# Patient Record
Sex: FEMALE | Race: BLACK OR AFRICAN AMERICAN | NOT HISPANIC OR LATINO | Employment: FULL TIME | URBAN - METROPOLITAN AREA
[De-identification: names, ages, dates, MRNs, and addresses within clinical notes are randomized per-mention and may not be internally consistent; named-entity substitution may affect disease eponyms.]

---

## 2024-02-24 ENCOUNTER — HOSPITAL ENCOUNTER (EMERGENCY)
Facility: CLINIC | Age: 39
Discharge: HOME | End: 2024-02-24
Attending: EMERGENCY MEDICINE | Admitting: EMERGENCY MEDICINE
Payer: COMMERCIAL

## 2024-02-24 VITALS
DIASTOLIC BLOOD PRESSURE: 108 MMHG | SYSTOLIC BLOOD PRESSURE: 138 MMHG | WEIGHT: 293 LBS | OXYGEN SATURATION: 99 % | RESPIRATION RATE: 18 BRPM | HEART RATE: 92 BPM | TEMPERATURE: 98.5 F | HEIGHT: 67 IN | BODY MASS INDEX: 45.99 KG/M2

## 2024-02-24 DIAGNOSIS — L03.317 CELLULITIS OF LEFT BUTTOCK: ICD-10-CM

## 2024-02-24 LAB
ANION GAP SERPL CALCULATED.3IONS-SCNC: 11 MMOL/L (ref 7–15)
BASOPHILS # BLD AUTO: 0.1 10E3/UL (ref 0–0.2)
BASOPHILS NFR BLD AUTO: 0 %
BUN SERPL-MCNC: 9.7 MG/DL (ref 6–20)
CALCIUM SERPL-MCNC: 9 MG/DL (ref 8.6–10)
CHLORIDE SERPL-SCNC: 103 MMOL/L (ref 98–107)
CREAT SERPL-MCNC: 0.78 MG/DL (ref 0.51–0.95)
CRP SERPL-MCNC: 187.5 MG/L
DEPRECATED HCO3 PLAS-SCNC: 23 MMOL/L (ref 22–29)
EGFRCR SERPLBLD CKD-EPI 2021: >90 ML/MIN/1.73M2
EOSINOPHIL # BLD AUTO: 0.2 10E3/UL (ref 0–0.7)
EOSINOPHIL NFR BLD AUTO: 1 %
ERYTHROCYTE [DISTWIDTH] IN BLOOD BY AUTOMATED COUNT: 14.7 % (ref 10–15)
GLUCOSE SERPL-MCNC: 100 MG/DL (ref 70–99)
HCT VFR BLD AUTO: 37.7 % (ref 35–47)
HGB BLD-MCNC: 11.9 G/DL (ref 11.7–15.7)
HOLD SPECIMEN: NORMAL
IMM GRANULOCYTES # BLD: 0.1 10E3/UL
IMM GRANULOCYTES NFR BLD: 1 %
LACTATE SERPL-SCNC: 1 MMOL/L (ref 0.7–2)
LYMPHOCYTES # BLD AUTO: 2.2 10E3/UL (ref 0.8–5.3)
LYMPHOCYTES NFR BLD AUTO: 12 %
MCH RBC QN AUTO: 27.1 PG (ref 26.5–33)
MCHC RBC AUTO-ENTMCNC: 31.6 G/DL (ref 31.5–36.5)
MCV RBC AUTO: 86 FL (ref 78–100)
MONOCYTES # BLD AUTO: 1.3 10E3/UL (ref 0–1.3)
MONOCYTES NFR BLD AUTO: 7 %
NEUTROPHILS # BLD AUTO: 15.3 10E3/UL (ref 1.6–8.3)
NEUTROPHILS NFR BLD AUTO: 79 %
NRBC # BLD AUTO: 0 10E3/UL
NRBC BLD AUTO-RTO: 0 /100
PLATELET # BLD AUTO: 295 10E3/UL (ref 150–450)
POTASSIUM SERPL-SCNC: 4.1 MMOL/L (ref 3.4–5.3)
RBC # BLD AUTO: 4.39 10E6/UL (ref 3.8–5.2)
SODIUM SERPL-SCNC: 137 MMOL/L (ref 135–145)
WBC # BLD AUTO: 19.1 10E3/UL (ref 4–11)

## 2024-02-24 PROCEDURE — 36415 COLL VENOUS BLD VENIPUNCTURE: CPT | Performed by: EMERGENCY MEDICINE

## 2024-02-24 PROCEDURE — 84703 CHORIONIC GONADOTROPIN ASSAY: CPT

## 2024-02-24 PROCEDURE — 85025 COMPLETE CBC W/AUTO DIFF WBC: CPT | Performed by: EMERGENCY MEDICINE

## 2024-02-24 PROCEDURE — 96365 THER/PROPH/DIAG IV INF INIT: CPT

## 2024-02-24 PROCEDURE — 99284 EMERGENCY DEPT VISIT MOD MDM: CPT | Mod: 25

## 2024-02-24 PROCEDURE — 80048 BASIC METABOLIC PNL TOTAL CA: CPT | Performed by: EMERGENCY MEDICINE

## 2024-02-24 PROCEDURE — 250N000013 HC RX MED GY IP 250 OP 250 PS 637: Performed by: EMERGENCY MEDICINE

## 2024-02-24 PROCEDURE — 86140 C-REACTIVE PROTEIN: CPT | Performed by: EMERGENCY MEDICINE

## 2024-02-24 PROCEDURE — 96361 HYDRATE IV INFUSION ADD-ON: CPT

## 2024-02-24 PROCEDURE — 258N000003 HC RX IP 258 OP 636: Performed by: EMERGENCY MEDICINE

## 2024-02-24 PROCEDURE — 87040 BLOOD CULTURE FOR BACTERIA: CPT | Mod: XS | Performed by: EMERGENCY MEDICINE

## 2024-02-24 PROCEDURE — 83605 ASSAY OF LACTIC ACID: CPT | Performed by: EMERGENCY MEDICINE

## 2024-02-24 PROCEDURE — 250N000011 HC RX IP 250 OP 636: Performed by: EMERGENCY MEDICINE

## 2024-02-24 RX ORDER — IBUPROFEN 600 MG/1
600 TABLET, FILM COATED ORAL ONCE
Status: COMPLETED | OUTPATIENT
Start: 2024-02-24 | End: 2024-02-24

## 2024-02-24 RX ORDER — KETOROLAC TROMETHAMINE 15 MG/ML
15 INJECTION, SOLUTION INTRAMUSCULAR; INTRAVENOUS ONCE
Status: DISCONTINUED | OUTPATIENT
Start: 2024-02-24 | End: 2024-02-24

## 2024-02-24 RX ORDER — CEFAZOLIN SODIUM IN 0.9 % NACL 3 G/100 ML
3 INTRAVENOUS SOLUTION, PIGGYBACK (ML) INTRAVENOUS
Status: DISCONTINUED | OUTPATIENT
Start: 2024-02-24 | End: 2024-02-24

## 2024-02-24 RX ORDER — OXYCODONE HYDROCHLORIDE 5 MG/1
5-10 TABLET ORAL EVERY 6 HOURS PRN
Qty: 10 TABLET | Refills: 0 | Status: SHIPPED | OUTPATIENT
Start: 2024-02-24 | End: 2024-02-27

## 2024-02-24 RX ORDER — CEFAZOLIN SODIUM IN 0.9 % NACL 3 G/100 ML
3 INTRAVENOUS SOLUTION, PIGGYBACK (ML) INTRAVENOUS EVERY 8 HOURS
Status: DISCONTINUED | OUTPATIENT
Start: 2024-02-24 | End: 2024-02-24

## 2024-02-24 RX ORDER — CEPHALEXIN 500 MG/1
500 CAPSULE ORAL 4 TIMES DAILY
Qty: 28 CAPSULE | Refills: 0 | Status: SHIPPED | OUTPATIENT
Start: 2024-02-24 | End: 2024-03-02

## 2024-02-24 RX ORDER — CEFAZOLIN SODIUM IN 0.9 % NACL 3 G/100 ML
3 INTRAVENOUS SOLUTION, PIGGYBACK (ML) INTRAVENOUS ONCE
Qty: 100 ML | Refills: 0 | Status: COMPLETED | OUTPATIENT
Start: 2024-02-24 | End: 2024-02-24

## 2024-02-24 RX ORDER — OXYCODONE HYDROCHLORIDE 5 MG/1
10 TABLET ORAL ONCE
Status: COMPLETED | OUTPATIENT
Start: 2024-02-24 | End: 2024-02-24

## 2024-02-24 RX ADMIN — OXYCODONE HYDROCHLORIDE 10 MG: 5 TABLET ORAL at 08:22

## 2024-02-24 RX ADMIN — SODIUM CHLORIDE 1000 ML: 9 INJECTION, SOLUTION INTRAVENOUS at 08:22

## 2024-02-24 RX ADMIN — CEFAZOLIN 3 G: 1 INJECTION, POWDER, FOR SOLUTION INTRAMUSCULAR; INTRAVENOUS at 09:50

## 2024-02-24 RX ADMIN — IBUPROFEN 600 MG: 600 TABLET, FILM COATED ORAL at 08:22

## 2024-02-24 ASSESSMENT — COLUMBIA-SUICIDE SEVERITY RATING SCALE - C-SSRS
6. HAVE YOU EVER DONE ANYTHING, STARTED TO DO ANYTHING, OR PREPARED TO DO ANYTHING TO END YOUR LIFE?: NO
1. IN THE PAST MONTH, HAVE YOU WISHED YOU WERE DEAD OR WISHED YOU COULD GO TO SLEEP AND NOT WAKE UP?: NO
2. HAVE YOU ACTUALLY HAD ANY THOUGHTS OF KILLING YOURSELF IN THE PAST MONTH?: NO

## 2024-02-24 ASSESSMENT — ACTIVITIES OF DAILY LIVING (ADL)
ADLS_ACUITY_SCORE: 33
ADLS_ACUITY_SCORE: 35
ADLS_ACUITY_SCORE: 33

## 2024-02-24 NOTE — ED NOTES
Bed: ED17  Expected date:   Expected time:   Means of arrival:   Comments:  Triage wound infection

## 2024-02-24 NOTE — ED NOTES
Pt is alert and oriented, respiration rate regular and unlabored. Pt verbalized understanding regarding discharge instructions, when to return to the ED, and follow up appointment details. Pt instructed on home care instructions and states she is comfortable with discharging from the hospital. Pt tolerating oral fluids, gait steady and ambulated without difficulty. Pt reported having a ride pick her up from the ED.

## 2024-02-24 NOTE — ED PROVIDER NOTES
"  History     Chief Complaint:  Wound Infection       The history is provided by the patient.      German Lowe is a 38 year old female who presents for a wound check. Patient reports she is concerned for an abscess with onset 3 days ago on her left buttock.  He notes she thinks there may have been a pimple there initially.  She also notes chills . Denies fever, vomiting, abdominal pain, or dizziness. She states she has had abscesses in the past but these were smaller and have typically resolved on their own, not requiring drainage. Notes history of asthma and hypertension, denying history of diabetes or immune disorders.    Independent Historian:   None - Patient Only    Review of External Notes:   No recent outpatient clinic notes available for review.    Medications:    The patient is not currently taking any prescribed medications.    Past Medical History:    Angioedema  Hypertension  Asthma  Tobacco use disorder     Physical Exam   Patient Vitals for the past 24 hrs:   BP Temp Temp src Pulse Resp SpO2 Height Weight   02/24/24 1108 -- -- -- -- 18 -- -- --   02/24/24 1105 -- -- -- 92 -- 99 % -- --   02/24/24 1100 (!) 138/108 -- -- -- -- -- -- --   02/24/24 1030 -- -- -- -- -- 93 % -- --   02/24/24 1020 -- -- -- -- -- 100 % -- --   02/24/24 1015 (!) 135/103 -- -- 81 -- -- -- --   02/24/24 1000 (!) 132/91 -- -- 77 -- 98 % -- --   02/24/24 0945 (!) 131/93 -- -- -- -- -- -- --   02/24/24 0930 132/86 -- -- 83 -- -- -- --   02/24/24 0720 (!) 150/109 98.5  F (36.9  C) Oral 96 18 -- -- --   02/24/24 0557 (!) 166/100 (!) 96  F (35.6  C) Temporal 100 18 99 % 1.702 m (5' 7\") 144.1 kg (317 lb 10.9 oz)      Physical Exam  General: Adult female, laying on her side  Eyes: PERRL, Conjunctive within normal limits  ENT: Moist mucous membranes, oropharynx clear.   CV: Normal S1S2, no murmur, rub or gallop. Regular rate and rhythm  Resp: Clear to auscultation bilaterally, no wheezes, rales or rhonchi. Normal respiratory " effort.  GI: Abdomen is soft, nontender and nondistended.   MSK: There is tenderness over the left buttock with thickened skin and increased warmth to touch.  No palpable fluctuance.  No extremity edema. Normal active range of motion.  Skin: Warm and dry.  Unable to appreciate erythema.  No rashes or lesions or ecchymoses on visible skin.  Neuro: Alert and oriented. Responds appropriately to all questions and commands. No focal findings appreciated. Normal muscle tone.  Psych: Normal mood and affect. Pleasant.     Emergency Department Course   Laboratory:  Labs Ordered and Resulted from Time of ED Arrival to Time of ED Departure   BASIC METABOLIC PANEL - Abnormal       Result Value    Sodium 137      Potassium 4.1      Chloride 103      Carbon Dioxide (CO2) 23      Anion Gap 11      Urea Nitrogen 9.7      Creatinine 0.78      GFR Estimate >90      Calcium 9.0      Glucose 100 (*)    CRP INFLAMMATION - Abnormal    CRP Inflammation 187.50 (*)    CBC WITH PLATELETS AND DIFFERENTIAL - Abnormal    WBC Count 19.1 (*)     RBC Count 4.39      Hemoglobin 11.9      Hematocrit 37.7      MCV 86      MCH 27.1      MCHC 31.6      RDW 14.7      Platelet Count 295      % Neutrophils 79      % Lymphocytes 12      % Monocytes 7      % Eosinophils 1      % Basophils 0      % Immature Granulocytes 1      NRBCs per 100 WBC 0      Absolute Neutrophils 15.3 (*)     Absolute Lymphocytes 2.2      Absolute Monocytes 1.3      Absolute Eosinophils 0.2      Absolute Basophils 0.1      Absolute Immature Granulocytes 0.1      Absolute NRBCs 0.0     LACTIC ACID WHOLE BLOOD - Normal    Lactic Acid 1.0     BLOOD CULTURE   BLOOD CULTURE      Emergency Department Course & Assessments:    Interventions:  Medications   sodium chloride 0.9% BOLUS 1,000 mL (0 mLs Intravenous Stopped 2/24/24 0949)   oxyCODONE (ROXICODONE) tablet 10 mg (10 mg Oral $Given 2/24/24 0822)   ibuprofen (ADVIL/MOTRIN) tablet 600 mg (600 mg Oral $Given 2/24/24 0822)   ceFAZolin  (ANCEF) 3 g in  mL intermittent infusion (0 g Intravenous Stopped 2/24/24 1037)     Assessments/Consultations/Discussion of Management or Tests:   ED Course as of 02/24/24 1047   Sat Feb 24, 2024   0630 I evaluated the patient and obtained history as noted above.   0737 Rechecked.  I discussed findings and plans.  All questions were answered.   1041 Updated and discharged.  She has no new concerns and feels comfortable with the plan.     Social Determinants of Health affecting care:   None    Disposition:  The patient was discharged.     Impression & Plan    Medical Decision Making:  German Lowe is a 38 year old female who presents for evaluation of pain and swelling over her left buttock.  The history, physical exam and supporting data are consistent with cellulitis, however she was tachycardic and the area was somewhat large and therefore more extensive evaluations undertaken.  Bedside ultrasound did not show evidence of fluid collection.  It did show cobblestoning consistent with probable cellulitis.  There do not appear at this time to be any complication of cellulitis including necrotizing fascitis, lymphangitis, lymphadenitis, abscess, osteomyelitis, sepsis, or shock.  She has a leukocytosis and elevated CRP.  Blood cultures were sent and pending.  The patient is not immunosuppressed.  Supportive outpatient management is indicated with antibiotics.  Close follow-up of primary care physician to ensure no progression and rapid resolution in 2 to 3 days.  She was recommended return should symptoms rapidly worsen or not improve over 2 days.  All questions were answered prior to discharge.    Diagnosis:    ICD-10-CM    1. Cellulitis of left buttock  L03.317            Discharge Medications:  New Prescriptions    CEPHALEXIN (KEFLEX) 500 MG CAPSULE    Take 1 capsule (500 mg) by mouth 4 times daily for 7 days    OXYCODONE (ROXICODONE) 5 MG TABLET    Take 1-2 tablets (5-10 mg) by mouth every 6 hours as needed  for severe pain      Scribe Disclosure:  I, Jessa SULTANAJaz Orellana, am serving as a scribe at 6:38 AM on 2/24/2024 to document services personally performed by Lucy Posada MD based on my observations and the provider's statements to me.    2/24/2024   Lucy Posada MD Jonkman, Tracy Dianne, MD  02/24/24 9818

## 2024-02-26 LAB — HCG SER QL IA.RAPID: NEGATIVE

## 2024-02-29 LAB
BACTERIA BLD CULT: NO GROWTH
BACTERIA BLD CULT: NO GROWTH

## 2024-12-11 ENCOUNTER — HOSPITAL ENCOUNTER (OUTPATIENT)
Dept: ULTRASOUND IMAGING | Facility: CLINIC | Age: 39
Discharge: HOME | End: 2024-12-11
Attending: STUDENT IN AN ORGANIZED HEALTH CARE EDUCATION/TRAINING PROGRAM
Payer: COMMERCIAL

## 2024-12-11 DIAGNOSIS — R22.42 LOCALIZED SWELLING, MASS, OR LUMP OF LOWER EXTREMITY, LEFT: ICD-10-CM

## 2024-12-11 PROCEDURE — 93971 EXTREMITY STUDY: CPT | Mod: LT

## 2024-12-12 ENCOUNTER — MEDICAL CORRESPONDENCE (OUTPATIENT)
Dept: SCHEDULING | Facility: CLINIC | Age: 39
End: 2024-12-12
Payer: COMMERCIAL

## 2025-01-05 ENCOUNTER — APPOINTMENT (OUTPATIENT)
Dept: MRI IMAGING | Facility: CLINIC | Age: 40
End: 2025-01-05
Attending: EMERGENCY MEDICINE
Payer: COMMERCIAL

## 2025-01-05 ENCOUNTER — HOSPITAL ENCOUNTER (EMERGENCY)
Facility: CLINIC | Age: 40
Discharge: HOME | End: 2025-01-06
Attending: EMERGENCY MEDICINE
Payer: COMMERCIAL

## 2025-01-05 DIAGNOSIS — I10 UNCONTROLLED HYPERTENSION: ICD-10-CM

## 2025-01-05 DIAGNOSIS — R20.2 PARESTHESIAS: ICD-10-CM

## 2025-01-05 DIAGNOSIS — R51.9 NONINTRACTABLE HEADACHE, UNSPECIFIED CHRONICITY PATTERN, UNSPECIFIED HEADACHE TYPE: ICD-10-CM

## 2025-01-05 LAB
ALBUMIN SERPL BCG-MCNC: 4.2 G/DL (ref 3.5–5.2)
ALP SERPL-CCNC: 118 U/L (ref 40–150)
ALT SERPL W P-5'-P-CCNC: 13 U/L (ref 0–50)
ANION GAP SERPL CALCULATED.3IONS-SCNC: 12 MMOL/L (ref 7–15)
AST SERPL W P-5'-P-CCNC: 12 U/L (ref 0–45)
BASOPHILS # BLD AUTO: 0.1 10E3/UL (ref 0–0.2)
BASOPHILS NFR BLD AUTO: 1 %
BILIRUB SERPL-MCNC: <0.2 MG/DL
BUN SERPL-MCNC: 16.9 MG/DL (ref 6–20)
CALCIUM SERPL-MCNC: 9.7 MG/DL (ref 8.8–10.4)
CHLORIDE SERPL-SCNC: 102 MMOL/L (ref 98–107)
CREAT SERPL-MCNC: 1.04 MG/DL (ref 0.51–0.95)
EGFRCR SERPLBLD CKD-EPI 2021: 69 ML/MIN/1.73M2
EOSINOPHIL # BLD AUTO: 0.3 10E3/UL (ref 0–0.7)
EOSINOPHIL NFR BLD AUTO: 3 %
ERYTHROCYTE [DISTWIDTH] IN BLOOD BY AUTOMATED COUNT: 15.6 % (ref 10–15)
GLUCOSE SERPL-MCNC: 102 MG/DL (ref 70–99)
HCG SERPL QL: NEGATIVE
HCO3 SERPL-SCNC: 24 MMOL/L (ref 22–29)
HCT VFR BLD AUTO: 38.4 % (ref 35–47)
HGB BLD-MCNC: 12.4 G/DL (ref 11.7–15.7)
HOLD SPECIMEN: NORMAL
HOLD SPECIMEN: NORMAL
IMM GRANULOCYTES # BLD: 0 10E3/UL
IMM GRANULOCYTES NFR BLD: 0 %
LYMPHOCYTES # BLD AUTO: 2.8 10E3/UL (ref 0.8–5.3)
LYMPHOCYTES NFR BLD AUTO: 29 %
MCH RBC QN AUTO: 27.3 PG (ref 26.5–33)
MCHC RBC AUTO-ENTMCNC: 32.3 G/DL (ref 31.5–36.5)
MCV RBC AUTO: 85 FL (ref 78–100)
MONOCYTES # BLD AUTO: 0.8 10E3/UL (ref 0–1.3)
MONOCYTES NFR BLD AUTO: 8 %
NEUTROPHILS # BLD AUTO: 5.8 10E3/UL (ref 1.6–8.3)
NEUTROPHILS NFR BLD AUTO: 59 %
NRBC # BLD AUTO: 0 10E3/UL
NRBC BLD AUTO-RTO: 0 /100
PLATELET # BLD AUTO: 287 10E3/UL (ref 150–450)
POTASSIUM SERPL-SCNC: 4.4 MMOL/L (ref 3.4–5.3)
PROT SERPL-MCNC: 7.7 G/DL (ref 6.4–8.3)
RBC # BLD AUTO: 4.54 10E6/UL (ref 3.8–5.2)
SODIUM SERPL-SCNC: 138 MMOL/L (ref 135–145)
TROPONIN T SERPL HS-MCNC: <6 NG/L
WBC # BLD AUTO: 9.9 10E3/UL (ref 4–11)

## 2025-01-05 PROCEDURE — 96375 TX/PRO/DX INJ NEW DRUG ADDON: CPT

## 2025-01-05 PROCEDURE — 250N000013 HC RX MED GY IP 250 OP 250 PS 637: Performed by: EMERGENCY MEDICINE

## 2025-01-05 PROCEDURE — 84703 CHORIONIC GONADOTROPIN ASSAY: CPT | Performed by: EMERGENCY MEDICINE

## 2025-01-05 PROCEDURE — 85025 COMPLETE CBC W/AUTO DIFF WBC: CPT | Performed by: EMERGENCY MEDICINE

## 2025-01-05 PROCEDURE — A9585 GADOBUTROL INJECTION: HCPCS | Performed by: EMERGENCY MEDICINE

## 2025-01-05 PROCEDURE — 96374 THER/PROPH/DIAG INJ IV PUSH: CPT | Mod: 59

## 2025-01-05 PROCEDURE — 80053 COMPREHEN METABOLIC PANEL: CPT | Performed by: EMERGENCY MEDICINE

## 2025-01-05 PROCEDURE — 93005 ELECTROCARDIOGRAM TRACING: CPT

## 2025-01-05 PROCEDURE — 70547 MR ANGIOGRAPHY NECK W/O DYE: CPT

## 2025-01-05 PROCEDURE — 70551 MRI BRAIN STEM W/O DYE: CPT

## 2025-01-05 PROCEDURE — 99285 EMERGENCY DEPT VISIT HI MDM: CPT | Mod: 25

## 2025-01-05 PROCEDURE — 84484 ASSAY OF TROPONIN QUANT: CPT | Performed by: EMERGENCY MEDICINE

## 2025-01-05 PROCEDURE — 255N000002 HC RX 255 OP 636: Performed by: EMERGENCY MEDICINE

## 2025-01-05 PROCEDURE — 70544 MR ANGIOGRAPHY HEAD W/O DYE: CPT

## 2025-01-05 PROCEDURE — 36415 COLL VENOUS BLD VENIPUNCTURE: CPT | Performed by: EMERGENCY MEDICINE

## 2025-01-05 RX ORDER — AMLODIPINE BESYLATE 5 MG/1
5 TABLET ORAL ONCE
Status: COMPLETED | OUTPATIENT
Start: 2025-01-05 | End: 2025-01-05

## 2025-01-05 RX ORDER — AMLODIPINE BESYLATE 5 MG/1
5 TABLET ORAL ONCE
Status: COMPLETED | OUTPATIENT
Start: 2025-01-05 | End: 2025-01-06

## 2025-01-05 RX ORDER — GADOBUTROL 604.72 MG/ML
10 INJECTION INTRAVENOUS ONCE
Status: COMPLETED | OUTPATIENT
Start: 2025-01-05 | End: 2025-01-05

## 2025-01-05 RX ORDER — HYDRALAZINE HYDROCHLORIDE 20 MG/ML
5 INJECTION INTRAMUSCULAR; INTRAVENOUS ONCE
Status: COMPLETED | OUTPATIENT
Start: 2025-01-05 | End: 2025-01-06

## 2025-01-05 RX ADMIN — AMLODIPINE BESYLATE 5 MG: 5 TABLET ORAL at 20:20

## 2025-01-05 ASSESSMENT — ACTIVITIES OF DAILY LIVING (ADL)
ADLS_ACUITY_SCORE: 41

## 2025-01-06 VITALS
OXYGEN SATURATION: 99 % | BODY MASS INDEX: 41.02 KG/M2 | HEIGHT: 71 IN | HEART RATE: 82 BPM | SYSTOLIC BLOOD PRESSURE: 157 MMHG | DIASTOLIC BLOOD PRESSURE: 107 MMHG | WEIGHT: 293 LBS | RESPIRATION RATE: 20 BRPM | TEMPERATURE: 97.6 F

## 2025-01-06 LAB
ATRIAL RATE - MUSE: 87 BPM
DIASTOLIC BLOOD PRESSURE - MUSE: NORMAL MMHG
INTERPRETATION ECG - MUSE: NORMAL
P AXIS - MUSE: 57 DEGREES
PR INTERVAL - MUSE: 118 MS
QRS DURATION - MUSE: 68 MS
QT - MUSE: 366 MS
QTC - MUSE: 440 MS
R AXIS - MUSE: 43 DEGREES
SYSTOLIC BLOOD PRESSURE - MUSE: NORMAL MMHG
T AXIS - MUSE: 33 DEGREES
VENTRICULAR RATE- MUSE: 87 BPM

## 2025-01-06 PROCEDURE — 250N000013 HC RX MED GY IP 250 OP 250 PS 637: Performed by: EMERGENCY MEDICINE

## 2025-01-06 PROCEDURE — 250N000011 HC RX IP 250 OP 636: Performed by: EMERGENCY MEDICINE

## 2025-01-06 RX ORDER — AMLODIPINE BESYLATE 10 MG/1
10 TABLET ORAL DAILY
Qty: 30 TABLET | Refills: 0 | Status: SHIPPED | OUTPATIENT
Start: 2025-01-06

## 2025-01-06 RX ORDER — ACETAMINOPHEN 500 MG
1000 TABLET ORAL ONCE
Status: COMPLETED | OUTPATIENT
Start: 2025-01-06 | End: 2025-01-06

## 2025-01-06 RX ORDER — HYDRALAZINE HYDROCHLORIDE 20 MG/ML
5 INJECTION INTRAMUSCULAR; INTRAVENOUS ONCE
Status: COMPLETED | OUTPATIENT
Start: 2025-01-06 | End: 2025-01-06

## 2025-01-06 RX ORDER — METOCLOPRAMIDE HYDROCHLORIDE 5 MG/ML
10 INJECTION INTRAMUSCULAR; INTRAVENOUS ONCE
Status: COMPLETED | OUTPATIENT
Start: 2025-01-06 | End: 2025-01-06

## 2025-01-06 RX ORDER — DIPHENHYDRAMINE HYDROCHLORIDE 50 MG/ML
25 INJECTION INTRAMUSCULAR; INTRAVENOUS ONCE
Status: COMPLETED | OUTPATIENT
Start: 2025-01-06 | End: 2025-01-06

## 2025-01-06 RX ADMIN — METOCLOPRAMIDE 10 MG: 5 INJECTION, SOLUTION INTRAMUSCULAR; INTRAVENOUS at 00:59

## 2025-01-06 RX ADMIN — DIPHENHYDRAMINE HYDROCHLORIDE 25 MG: 50 INJECTION, SOLUTION INTRAMUSCULAR; INTRAVENOUS at 00:59

## 2025-01-06 RX ADMIN — AMLODIPINE BESYLATE 5 MG: 5 TABLET ORAL at 00:11

## 2025-01-06 RX ADMIN — ACETAMINOPHEN 1000 MG: 500 TABLET, FILM COATED ORAL at 00:59

## 2025-01-06 RX ADMIN — HYDRALAZINE HYDROCHLORIDE 5 MG: 20 INJECTION INTRAMUSCULAR; INTRAVENOUS at 00:11

## 2025-01-06 ASSESSMENT — ACTIVITIES OF DAILY LIVING (ADL)
ADLS_ACUITY_SCORE: 41
ADLS_ACUITY_SCORE: 41

## 2025-01-06 NOTE — ED PROVIDER NOTES
"  Emergency Department Note      History of Present Illness     Chief Complaint   Hypertension      HPI   German Lowe is a 39 year old female who presents at the emergency department for evaluation of hypertension. The patient reports that she felt dizzy and lightheaded while at work today. She endorses left leg swelling, but denies right leg symptoms.  She states that she had a negative ultrasound in the last month for this and declines any further workup at this time.  She states that she believes this is due to her blood pressure elevation.  She mentions that she has been off amlodipine for a \"long time.\"  The patient claims that her speech has been \"slow\" at times.      Independent Historian   None    Review of External Notes   Reviewed outside health system ED visit from 11/12/2024 as well as 4/4/2024    Past Medical History     Medical History and Problem List   Asthma  Hypertension      Physical Exam     Patient Vitals for the past 24 hrs:   BP Temp Temp src Pulse Resp SpO2 Height Weight   01/06/25 0204 (!) 157/107 -- -- -- -- -- -- --   01/06/25 0146 -- -- -- -- -- 99 % -- --   01/06/25 0145 -- -- -- -- -- 99 % -- --   01/06/25 0144 -- -- -- -- -- 99 % -- --   01/06/25 0143 -- -- -- -- -- 100 % -- --   01/06/25 0142 -- -- -- -- -- 99 % -- --   01/06/25 0141 -- -- -- -- -- 100 % -- --   01/06/25 0140 (!) 157/107 -- -- 82 -- 100 % -- --   01/06/25 0132 -- -- -- -- -- 100 % -- --   01/06/25 0131 -- -- -- -- -- 99 % -- --   01/06/25 0130 (!) 164/115 -- -- 76 -- 100 % -- --   01/06/25 0129 -- -- -- -- -- 99 % -- --   01/06/25 0128 -- -- -- -- -- 98 % -- --   01/06/25 0127 (!) 172/122 -- -- 88 -- 96 % -- --   01/06/25 0126 -- -- -- -- -- 97 % -- --   01/06/25 0125 -- -- -- -- -- 100 % -- --   01/06/25 0114 -- -- -- -- -- 100 % -- --   01/06/25 0113 -- -- -- -- -- 100 % -- --   01/06/25 0112 -- -- -- -- -- 100 % -- --   01/06/25 0111 -- -- -- -- -- 100 % -- --   01/06/25 0110 (!) 160/109 -- -- 78 -- 99 % -- -- " "  01/06/25 0109 -- -- -- -- -- 100 % -- --   01/06/25 0108 -- -- -- -- -- 100 % -- --   01/06/25 0050 (!) 182/130 -- -- 107 -- 100 % -- --   01/06/25 0040 (!) 177/117 -- -- 91 -- 99 % -- --   01/06/25 0033 -- -- -- -- -- 100 % -- --   01/06/25 0032 -- -- -- -- -- 100 % -- --   01/06/25 0031 -- -- -- -- -- 99 % -- --   01/06/25 0030 (!) 179/115 -- -- 93 -- 99 % -- --   01/06/25 0029 -- -- -- -- -- 100 % -- --   01/06/25 0028 -- -- -- -- -- 100 % -- --   01/06/25 0027 -- -- -- -- -- 100 % -- --   01/06/25 0026 -- -- -- -- -- 100 % -- --   01/06/25 0025 -- -- -- -- -- 100 % -- --   01/06/25 0024 -- -- -- -- -- 100 % -- --   01/06/25 0023 -- -- -- -- -- 100 % -- --   01/06/25 0022 -- -- -- -- -- 100 % -- --   01/06/25 0021 -- -- -- -- -- 100 % -- --   01/06/25 0020 (!) 172/110 -- -- 86 -- 100 % -- --   01/06/25 0019 (!) 172/110 -- -- 86 -- 100 % -- --   01/06/25 0010 -- -- -- -- -- 100 % -- --   01/05/25 2332 (!) 185/114 -- -- 90 -- 99 % -- --   01/05/25 2310 (!) 187/107 -- -- 98 -- -- -- --   01/05/25 2114 -- -- -- -- -- 98 % -- --   01/05/25 2113 (!) 182/122 -- -- 91 -- -- -- --   01/05/25 2058 (!) 179/107 -- -- 86 -- 99 % -- --   01/05/25 2039 -- -- -- -- -- 98 % -- --   01/05/25 2038 (!) 197/123 -- -- 90 -- -- -- --   01/05/25 2020 (!) 201/112 -- -- 97 20 100 % -- --   01/05/25 1914 (!) 190/116 -- -- -- -- -- -- --   01/05/25 1911 -- 97.6  F (36.4  C) Temporal 96 20 100 % 1.803 m (5' 11\") 150 kg (330 lb 11 oz)     Physical Exam  Constitutional: Alert, attentive  HENT:    Nose: Nose normal.    Mouth/Throat: Oropharynx is clear, mucous membranes are moist  Eyes: EOM are normal. Pupils are equal, round, and reactive to light.   CV: Regular rate and rhythm, no murmurs, rubs or gallops.  Chest: Effort normal and breath sounds normal.   GI: No distension. There is no tenderness  MSK: Normal range of motion.   Neurological:   A/Ox3; Cranial nerves 2-12 intact;   5/5 strength throughout the upper and lower extremities; "   sensation intact to light touch throughout the upper and lower extremities;   normal gait   No meningismus   Skin: Skin is warm and dry.      Diagnostics     Lab Results   Labs Ordered and Resulted from Time of ED Arrival to Time of ED Departure   COMPREHENSIVE METABOLIC PANEL - Abnormal       Result Value    Sodium 138      Potassium 4.4      Carbon Dioxide (CO2) 24      Anion Gap 12      Urea Nitrogen 16.9      Creatinine 1.04 (*)     GFR Estimate 69      Calcium 9.7      Chloride 102      Glucose 102 (*)     Alkaline Phosphatase 118      AST 12      ALT 13      Protein Total 7.7      Albumin 4.2      Bilirubin Total <0.2     CBC WITH PLATELETS AND DIFFERENTIAL - Abnormal    WBC Count 9.9      RBC Count 4.54      Hemoglobin 12.4      Hematocrit 38.4      MCV 85      MCH 27.3      MCHC 32.3      RDW 15.6 (*)     Platelet Count 287      % Neutrophils 59      % Lymphocytes 29      % Monocytes 8      % Eosinophils 3      % Basophils 1      % Immature Granulocytes 0      NRBCs per 100 WBC 0      Absolute Neutrophils 5.8      Absolute Lymphocytes 2.8      Absolute Monocytes 0.8      Absolute Eosinophils 0.3      Absolute Basophils 0.1      Absolute Immature Granulocytes 0.0      Absolute NRBCs 0.0     TROPONIN T, HIGH SENSITIVITY - Normal    Troponin T, High Sensitivity <6     HCG QUALITATIVE PREGNANCY - Normal    hCG Serum Qualitative Negative         Imaging   MRA Brain ([place]) wo Contrast   Final Result   IMPRESSION:   HEAD MRI:    1.  No acute infarct, mass, mass effect, or hemorrhage.      HEAD MRA:    1.  Normal MRA [place].      NECK MRA:   1.  Please note study is suboptimal; significantly degraded by motion artifact.   2.  No definite gross evidence of hemodynamically significant narrowing.      MRA Angiogram Neck w/o Contrast   Final Result   IMPRESSION:   HEAD MRI:    1.  No acute infarct, mass, mass effect, or hemorrhage.      HEAD MRA:    1.  Normal MRA [place].      NECK MRA:    1.  Please note study is suboptimal; significantly degraded by motion artifact.   2.  No definite gross evidence of hemodynamically significant narrowing.      MR Brain w/o Contrast   Final Result   IMPRESSION:   HEAD MRI:    1.  No acute infarct, mass, mass effect, or hemorrhage.      HEAD MRA:    1.  Normal MRA [place].      NECK MRA:   1.  Please note study is suboptimal; significantly degraded by motion artifact.   2.  No definite gross evidence of hemodynamically significant narrowing.          EKG   ECG taken at 1940, ECG read at 1135  Sinus rhythm   Rate 87 bpm. SD interval 118 ms. QRS duration 68 ms. QT/QTc 366/440 ms. P-R-T axes 57 43 33.    Independent Interpretation   None    ED Course      Medications Administered   Medications   amLODIPine (NORVASC) tablet 5 mg (5 mg Oral $Given 1/5/25 2020)   gadobutrol (GADAVIST) injection 10 mL (10 mLs Intravenous Not Given 1/5/25 2125)   sodium chloride (PF) 0.9% PF flush 60 mL (100 mLs Intravenous Not Given 1/5/25 2126)   amLODIPine (NORVASC) tablet 5 mg (5 mg Oral $Given 1/6/25 0011)   hydrALAZINE (APRESOLINE) injection 5 mg (5 mg Intravenous $Given 1/6/25 0011)   diphenhydrAMINE (BENADRYL) injection 25 mg (25 mg Intravenous $Given 1/6/25 0059)   metoclopramide (REGLAN) injection 10 mg (10 mg Intravenous $Given 1/6/25 0059)   acetaminophen (TYLENOL) tablet 1,000 mg (1,000 mg Oral $Given 1/6/25 0059)          ED Course   ED Course as of 01/06/25 0240   Sun Jan 05, 2025 1954 I obtained history and examined the patient as noted above     2044 I rechecked the patient and explained findings.     2355 I rechecked the patient and explained findings.         Additional Documentation  None    Medical Decision Making / Diagnosis     CMS Diagnoses: None    MIPS       None    MDM   German Lowe is a 39 year old female with history of hypertension, previously on amlodipine 10 mg daily who presents for evaluation of headache, paresthesias, recent but not current  speech changes, and uncontrolled high blood pressure.  Patient has a normal neurologic exam.  I did not observe asymmetric swelling to the left leg and the patient declines ultrasound for further workup of the same.  Given uncontrolled hypertension and neurologic symptoms, the above workup was performed.  Fortunately, there is no evidence of stroke.  MRA is limited based on motion but she has no neck pain to suggest cervical vessel dissection.  On recheck, blood pressure is improved and neurologic symptoms including headache are resolved.  I recommended admission given symptomatic hypertension and significant uncontrolled hypertension.  However, the patient declines noting that the hospitals make her nervous and elevated blood pressure.  Will plan for amlodipine 10 mg and) care follow-up.  I have placed an urgent primary care follow-up referral to establish care and recheck blood pressure management.  Return precautions for headache, chest pain, stroke symptoms, or any other concerns.      Disposition   The patient was admitted to the hospital.     Diagnosis     ICD-10-CM    1. Nonintractable headache, unspecified chronicity pattern, unspecified headache type  R51.9       2. Paresthesias  R20.2       3. Uncontrolled hypertension  I10            Discharge Medications   Discharge Medication List as of 1/6/2025  1:40 AM        START taking these medications    Details   amLODIPine (NORVASC) 10 MG tablet Take 1 tablet (10 mg) by mouth daily., Disp-30 tablet, R-0, E-Prescribe               Scribe Disclosure:  Kareem NASH, am serving as a scribe at 7:54 PM on 1/5/2025 to document services personally performed by Lee Irwin MD based on my observations and the provider's statements to me.        Lee Irwin MD  01/06/25 2630

## 2025-01-06 NOTE — DISCHARGE INSTRUCTIONS
It was a pleasure taking care of you today. I hope you feel much better soon.  Your evaluation was reassuring against stroke or other complication of high blood pressure.  Take amlodipine as prescribed.  Please follow-up with your primary care doctor in 3-5 days.  Return immediately for headache, stroke symptoms, chest pain, or any other concerns.

## 2025-01-06 NOTE — ED TRIAGE NOTES
Pt reports she was feeling dizzy while at work so she checked her blood pressure. 170/100's. Pt is supposed to be taking amlodipine and doesn't have any. She is from out of town and doesn't have a doctor here. Pt hasn't been on blood pressure medication for a few years. Pt also c/o bilateral leg swelling since November

## 2025-01-06 NOTE — ED NOTES
Patient discharged, IV removed, AVS given, Health teaching done, Offered wheelchair patient declines.

## 2025-01-06 NOTE — ED NOTES
Pt gives note to registration staying that she has numbness in L arm.  MD notified, pt brought back to intake room for reevaluation.  Pt also endorses 8/10 pain in left arm.

## 2025-01-06 NOTE — ED NOTES
"Dr. Irwin aware of blood pressure, orders received for bp control.     Pt states \"I don't feel well.\"  "